# Patient Record
(demographics unavailable — no encounter records)

---

## 2018-01-19 NOTE — XRAY REPORT
RIGHT ANKLE RADIOGRAPHS



INDICATION: Pain, edema, trauma.



COMPARISON: None similar.



FINDINGS: AP, lateral and oblique right ankle radiographs demonstrate 

an oblique distal fibular metadiaphyseal fracture with possible 

extension to the distal talofibular joint.  Cortical offset of 

approximately 2 mm laterally.  An approximately 1.3 cm distal fibular 

smooth cortical bulge/convexity medially incidentally noted 

approximately 6.5 cm proximal to the lateral malleolus.  Distal tibia, 

medial malleolus, ankle mortise and talar dome contour appear 

preserved.  Plantar calcaneal spur.  Demineralized bones.  Diffuse 

ankle soft tissue swelling, lateral much greater than medial.



CONCLUSION: Right distal fibular acute spiral fracture with overlying 

soft tissue swelling and various other incidental findings, as above.  

Please correlate.



Thank you for the opportunity to participate in this patient's care.

## 2018-01-19 NOTE — EMERGENCY DEPARTMENT REPORT
ED Lower Extremity HPI





- General


Chief Complaint: Extremity Injury, Lower


Stated Complaint: RIGHT ANKLE INJURY


Time Seen by Provider: 18 15:17


Source: patient


Mode of arrival: Ambulatory


Limitations: No Limitations





- History of Present Illness


Initial Comments: 


56-year-old male past medical history none presents with complaint of 2 days of 

right ankle pain.  Patient states he slipped on ice outside his home and 

experienced severe pain and swelling immediately afterward.  Patient has 

visible ecchymosis and swelling to right distal ankle region.  Denies any other 

injuries denies loss of consciousness denies any head injury or other extremity 

injury associated with fall.  Patient is accompanied by family friend at 

bedside.


Onset/Timin


-: days(s)


Injury: Ankle: Right


Type of Injury: other (fall)


Place: street/outdoors (icy sidewalk)


Severity: moderate


Severity scale (0 -10): 5


Improves With: cold therapy, immobilization, rest


Worsens With: weight bearing, palpation


Context: fall


Associated Symptoms: snap/pop sensation, swelling, unable to bear weight





- Related Data


 Previous Rx's











 Medication  Instructions  Recorded  Last Taken  Type


 


Cephalexin [Keflex] 250 mg PO Q6HR #28 capsule 09/02/15 Unknown Rx


 


Ibuprofen [Motrin 800 MG tab] 800 mg PO Q8HR PRN #30 tablet 09/02/15 Unknown Rx


 


HYDROcodone/APAP 5-325 [[place] 1 each PO Q6HR PRN #20 tablet 18 Unknown Rx





5/325]    


 


Naproxen 500 mg PO BID PRN #30 tablet 18 Unknown Rx











 Allergies











Allergy/AdvReac Type Severity Reaction Status Date / Time


 


No Known Allergies Allergy   Unverified 09/02/15 17:33














ED Review of Systems


ROS: 


Stated complaint: RIGHT ANKLE INJURY


Other details as noted in HPI





Constitutional: denies: chills, fever


Eyes: denies: eye pain, eye discharge, vision change


ENT: denies: ear pain, throat pain


Respiratory: denies: cough, shortness of breath, wheezing


Cardiovascular: denies: chest pain, palpitations


Endocrine: no symptoms reported


Gastrointestinal: denies: abdominal pain, nausea, diarrhea


Genitourinary: denies: urgency, dysuria


Musculoskeletal: as per HPI, arthralgia.  denies: back pain, joint swelling


Skin: denies: rash, lesions


Neurological: denies: headache, weakness, paresthesias


Psychiatric: denies: anxiety, depression


Hematological/Lymphatic: denies: easy bleeding, easy bruising





ED Past Medical Hx





- Past Medical History


Previous Medical History?: No





- Surgical History


Past Surgical History?: No





- Social History


Smoking Status: Never Smoker


Substance Use Type: Alcohol





- Medications


Home Medications: 


 Home Medications











 Medication  Instructions  Recorded  Confirmed  Last Taken  Type


 


Cephalexin [Keflex] 250 mg PO Q6HR #28 capsule 09/02/15  Unknown Rx


 


Ibuprofen [Motrin 800 MG tab] 800 mg PO Q8HR PRN #30 tablet 09/02/15  Unknown Rx


 


HYDROcodone/APAP 5-325 [[place] 1 each PO Q6HR PRN #20 tablet 18  Unknown Rx





5/325]     


 


Naproxen 500 mg PO BID PRN #30 tablet 18  Unknown Rx














ED Physical Exam





- General


Limitations: No Limitations


General appearance: alert, in no apparent distress





- Head


Head exam: Present: atraumatic, normocephalic





- Eye


Eye exam: Present: normal appearance, PERRL, EOMI





- ENT


ENT exam: Present: mucous membranes moist





- Neck


Neck exam: Present: normal inspection





- Respiratory


Respiratory exam: Present: normal lung sounds bilaterally.  Absent: respiratory 

distress





- Cardiovascular


Cardiovascular Exam: Present: regular rate, normal rhythm.  Absent: systolic 

murmur, diastolic murmur, rubs, gallop





- GI/Abdominal


GI/Abdominal exam: Present: soft, normal bowel sounds





- Rectal


Rectal exam: Present: deferred





- Extremities Exam


Extremities exam: Present: normal inspection





- Expanded Lower Extremity Exam


  ** Right


Hip exam: Present: normal inspection, full ROM


Upper Leg exam: Present: normal inspection


Knee exam: Present: normal inspection, full ROM


Lower Leg exam: Present: swelling (significant swelling and ecchymosis anterior 

right ankle joint above dorsum of foot, pain in the lateral and medial 

malleolus ), ecchymosis


Ankle exam: Present: tenderness, swelling, ecchymosis


Foot/Toe exam: Present: tenderness, swelling


Neuro vascular tendon exam: Present: no vascular compromise (distal dorsalis 

pedis and posterior tibial pulses intact)





  __________________________














  __________________________





 1 - Significant swelling and ecchymosis here








- Back Exam


Back exam: Present: normal inspection





- Neurological Exam


Neurological exam: Present: alert, oriented X3, CN II-XII intact, normal gait





- Psychiatric


Psychiatric exam: Present: normal affect, normal mood





- Skin


Skin exam: Present: warm, dry, intact, normal color.  Absent: rash





ED Course





 Vital Signs











  18





  12:35 15:40


 


Temperature 98.4 F 


 


Pulse Rate 100 H 


 


Respiratory 16 20





Rate  


 


Blood Pressure 129/95 


 


O2 Sat by Pulse 96 





Oximetry  














ED Lower Extremity MDM





- Medical Decision Making


A/P: Right ankle fracture, possible ligamentous injury


1-RICE therapy, posterior splint right ankle, naproxen when necessary, short 

course of Norco when necessary


2-right lower extremity arterial Doppler shows good distal blood flow, right 

lower extremity venous Doppler shows no DVT.  Sensation intact on clinical exam


3-crutches, follow-up with orthopedics.  I advised patient to follow up as he 

may have a possible ligament injury to right ankle given degree of ecchymosis 

and swelling.  I advised him that this is important because we want to mitigate 

any long-term dysfunction or injury to right ankle joint.  Patient stated he 

would follow-up as soon as possible.  This conversation was witnessed by patient

's family friend at bedside.


 


Critical care attestation.: 


If time is entered above; I have spent that time in minutes in the direct care 

of this critically ill patient, excluding procedure time.








ED Disposition


Clinical Impression: 


Closed right ankle fracture


Qualifiers:


 Encounter type: initial encounter Qualified Code(s): S82.891A - Other fracture 

of right lower leg, initial encounter for closed fracture





Disposition: - TO HOME OR SELFCARE


Is pt being admited?: No


Does the pt Need Aspirin: No


Condition: Stable


Instructions:  Ankle Fracture (ED), RICE Therapy (ED), Crutch Instructions (ED)


Prescriptions: 


HYDROcodone/APAP 5-325 [[place] 5/325] 1 each PO Q6HR PRN #20 tablet


 PRN Reason: Pain


Naproxen 500 mg PO BID PRN #30 tablet


 PRN Reason: Pain


Referrals: 


LETA EGAN MD [Staff Physician] - 3-5 Days


[facility] [Provider Group] - 3-5 Days


Forms:  Accompanied Note, Work/School Release Form(ED)


Time of Disposition: 16:54

## 2018-02-07 NOTE — CAT SCAN REPORT
CT SCAN OF THE CERVICAL SPINE:



HISTORY:  Fall with neck injury.



TECHNIQUE: Contiguous 1.25 mm axial images of the cervical spine were

obtained.  Sagittal and coronal reformatted images.



FINDINGS:

There is normal alignment of the cervical spine.  The body,

pedicles and posterior ligaments appear normal.  No evidence of

fracture or subluxation is seen.  Moderate degenerative disc disease is 

identified at C5-6. The spinal canal appears normal. The prevertebral 

soft tissues appear normal.



IMPRESSION:

Cervical spondylosis.  No acute process is noted.

## 2018-02-07 NOTE — CAT SCAN REPORT
CT HEAD WITHOUT CONTRAST:



HISTORY: Fall with head injury.



TECHNIQUE:

Sequential CT images without contrast.



FINDINGS:

Images obtained show bilateral prominence of the sulci and

ventricles.  There are no abnormal intra- or extra-axial blood or

fluid collections.  There are no focal masses or evidence of mass

effect. The gray white matter differentiation appears within normal 

limits.  Regions of periventricular decreased attenuation are 

consistent with microangiopathic ischemic disease.  The posterior fossa 

structures including the fourth ventricle, cerebellum, and brainstem 

appear normal.



IMPRESSION:

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  No acute 

intracranial process noted.

## 2018-02-07 NOTE — EMERGENCY DEPARTMENT REPORT
HPI





- General


Chief Complaint: Fall


Time Seen by Provider: 02/07/18 10:57





- HPI


HPI: 


56-year-old  male presents to the emergency department with a 

laceration over his left eye and some head trauma after he fell down a few 

stairs.  Patient says that he was intoxicated at home and missed a step and 

went down 3 steps total.  He denies any loss of consciousness.  He is unsure 

about his tetanus vaccination status.  He is currently awake and alert and has 

no complaints.  He denies any headache, vision change, neck pain, chest pain, 

back pain, numbness or paresthesias or any neurological deficits.  He otherwise 

denies any past medical history.  He does not have a primary care physician.








ED Past Medical Hx





- Past Medical History


Previous Medical History?: No





- Surgical History


Past Surgical History?: No





- Social History


Smoking Status: Never Smoker


Substance Use Type: Alcohol





- Medications


Home Medications: 


 Home Medications











 Medication  Instructions  Recorded  Confirmed  Last Taken  Type


 


Cephalexin [Keflex] 250 mg PO Q6HR #28 capsule 09/02/15  Unknown Rx


 


Ibuprofen [Motrin 800 MG tab] 800 mg PO Q8HR PRN #30 tablet 09/02/15  Unknown Rx


 


HYDROcodone/APAP 5-325 [[place] 1 each PO Q6HR PRN #20 tablet 01/19/18  Unknown Rx





5/325]     


 


Naproxen 500 mg PO BID PRN #30 tablet 01/19/18  Unknown Rx














ED Review of Systems


ROS: 


Stated complaint: FALL/FACIAL LAC


Other details as noted in HPI





Comment: All other systems reviewed and negative


Constitutional: denies: chills, fever


Eyes: other (left periorbital swelling and ecchymosis and small laceration).  

denies: eye discharge, vision change


ENT: denies: ear pain, throat pain


Respiratory: denies: cough, shortness of breath, wheezing


Cardiovascular: denies: chest pain, palpitations


Gastrointestinal: denies: abdominal pain, nausea, diarrhea


Genitourinary: denies: urgency, dysuria


Musculoskeletal: denies: back pain, joint swelling, arthralgia


Skin: denies: rash, lesions


Neurological: denies: weakness, numbness, paresthesias, confusion





Physical Exam





- Physical Exam


Vital Signs: 


 Vital Signs











  02/07/18





  10:22


 


Temperature 98.3 F


 


Pulse Rate 74


 


Respiratory 20





Rate 


 


Blood Pressure 108/67


 


O2 Sat by Pulse 93





Oximetry 











Physical Exam: 


GENERAL: The patient is well-developed well-nourished.


HENT: Normocephalic.  Atraumatic.    Patient has moist mucous membranes.  

Oropharynx is clear.  No septal hematoma.


EYES: Extraocular motions are intact.  Pupils equal reactive to light 

bilaterally.  No nystagmus.


NECK: Supple.  Trachea is midline.  No midline or bilateral paraspinal 

tenderness to palpation, step-off or deformity.


CHEST/LUNGS: Clear to auscultation.  There is no respiratory distress noted.


HEART/CARDIOVASCULAR: Regular.  There is no tachycardia.  There is no murmur.


ABDOMEN: Abdomen is soft, nontender.  Patient has normal bowel sounds.  There 

is no abdominal distention.


SKIN: There is left periorbital swelling and ecchymosis.  There is a large 

abrasion and/or skin tear like road rash to the left upper cheek and superior 

left orbit with some very mild oozing of venous blood.  There is some 

ecchymosis to the left inferior portion of the chin.


NEURO: The patient is awake, alert, and oriented but does appear slightly 

intoxicated.  The patient is cooperative.  The patient has no focal neurologic 

deficits.  The patient has normal speech and gait.  Cranial nerves II through 

XII grossly intact.


MUSCULOSKELETAL: There is no tenderness or deformity.  There is no limitation 

range of motion.  There is no evidence of acute injury.  Muscle strength 5 out 

of 5 upper and lower extremities bilaterally.  


BACK: No midline thoracic or lumbar tenderness to palpation, step-off or 

deformity.











ED Course


 Vital Signs











  02/07/18





  10:22


 


Temperature 98.3 F


 


Pulse Rate 74


 


Respiratory 20





Rate 


 


Blood Pressure 108/67


 


O2 Sat by Pulse 93





Oximetry 














ED Medical Decision Making





- Lab Data


Result diagrams: 


 02/07/18 10:42





 02/07/18 10:42





- Radiology Data


Radiology results: report reviewed





CT HEAD WITHOUT CONTRAST: 





HISTORY: Fall with head injury. 





TECHNIQUE: 


Sequential CT images without contrast. 





FINDINGS: 


Images obtained show bilateral prominence of the sulci and 


ventricles. There are no abnormal intra- or extra-axial blood or 


fluid collections. There are no focal masses or evidence of mass 


effect. The gray white matter differentiation appears within normal 


limits. Regions of periventricular decreased attenuation are 


consistent with microangiopathic ischemic disease. The posterior fossa 


structures including the fourth ventricle, cerebellum, and brainstem 


appear normal. 





IMPRESSION: 


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 No acute 


intracranial process noted. 





Transcribed By: TTR 


Dictated By: YAN CAMACHO JR, MD 


Electronically Authenticated By: YAN CAMACHO JR, MD 


Signed Date/Time: 02/07/18 1200 








CT SCAN OF THE CERVICAL SPINE: 





HISTORY: Fall with neck injury. 





TECHNIQUE: Contiguous 1.25 mm axial images of the cervical spine were 


obtained. Sagittal and coronal reformatted images. 





FINDINGS: 


There is normal alignment of the cervical spine. The body, 


pedicles and posterior ligaments appear normal. No evidence of 


fracture or subluxation is seen. Moderate degenerative disc disease is 


identified at C5-6. The spinal canal appears normal. The prevertebral 


soft tissues appear normal. 





IMPRESSION: 


Cervical spondylosis. No acute process is noted. 





Transcribed By: TTR 


Dictated By: YAN CAMACHO JR, MD 


Electronically Authenticated By: YAN CAMACHO JR, MD 


Signed Date/Time: 02/07/18 1201 





- Medical Decision Making


Patient fell down a few stairs and hit his face on the concrete steps.  

Apparently there was no loss of consciousness.  CT of the head did not show any 

bleed, shift, mass or any acute process.  CT of the cervical spine also did not 

show any fracture, subluxation or any acute process.  Patient has a mild 

leukocytosis which is most likely reactive to the incident.  He has a blood 

alcohol level of 0.24 and does show some signs of intoxication but otherwise is 

awake and alert and cooperative/compliant.  He has some mild hyponatremia at 

129 and we'll get some IV fluid along with his banana bag.  Tetanus booster is 

updated.  The patient has been reevaluated multiple times over multiple hours 

and has remained stable.  He appears safe for discharge home at this time but 

he will remain in the emergency department until his wife comes and gets him 

secondary to his alcohol intoxication.





- Differential Diagnosis


fracture, brain bleed, contusion, laceration, abrasion


Critical Care Time: No


Critical care attestation.: 


If time is entered above; I have spent that time in minutes in the direct care 

of this critically ill patient, excluding procedure time.








ED Disposition


Clinical Impression: 


 Fall (on) (from) other stairs and steps, initial encounter, Hyponatremia





Alcohol intoxication


Qualifiers:


 Complication of substance-induced condition: uncomplicated Qualified Code(s): 

F10.920 - Alcohol use, unspecified with intoxication, uncomplicated





Head injury


Qualifiers:


 Encounter type: initial encounter Qualified Code(s): S09.90XA - Unspecified 

injury of head, initial encounter





Facial abrasion


Qualifiers:


 Encounter type: initial encounter Qualified Code(s): S00.81XA - Abrasion of 

other part of head, initial encounter





Facial contusion


Qualifiers:


 Encounter type: initial encounter Qualified Code(s): S00.83XA - Contusion of 

other part of head, initial encounter





Disposition: DC-01 TO HOME OR SELFCARE


Is pt being admited?: No


Condition: Stable


Instructions:  Minor Head Injury (ED), Alcohol Intoxication (ED), Abuse of 

Alcohol (ED), Abrasion (ED), Fall Prevention (ED)


Additional Instructions: 


Please follow-up with your primary care doctor in the next few days.  You can 

use soap and water to clean your abrasions but then make sure they remain dry.  

Make sure you were seen sooner if you start noticing any surrounding redness, 

discharge of pus, or any signs or symptoms of infection.  Return to the 

emergency Department with any worsening of your symptoms or any acute distress.


Referrals: 


VASQUEZ ONEIL MD [Referring] - ASAP


Time of Disposition: 14:26